# Patient Record
(demographics unavailable — no encounter records)

---

## 2024-11-25 NOTE — PHYSICAL EXAM
[Chaperone Present] : A chaperone was present in the examining room during all aspects of the physical examination [89048] : A chaperone was present during the pelvic exam. [Soft] : soft [Non-tender] : non-tender [Non-distended] : non-distended [No Mass] : no mass [Breast Abnormal Secretion Bloody Fluid] : a bloody discharge [FreeTextEntry4] : episiotomy repair very tender and swollen repair appears intact

## 2024-11-25 NOTE — HISTORY OF PRESENT ILLNESS
[FreeTextEntry1] : pt delivered 4 days ago  BP was borderline in hospital PP here for BP check also nipples very sore and episiotomy repair very painful

## 2024-11-25 NOTE — PLAN
[FreeTextEntry1] : advised she air dry her nipples to try breast shells to help pull out the nipple more between feedings so baby latches with more nipple in her mouth advised sitz baths and at least daily shower with plenty of water to clean the perineum avoid creams that trap in bacteria BP is acceptable ret for PP visit

## 2025-01-08 NOTE — HISTORY OF PRESENT ILLNESS
[Postpartum Follow Up] : postpartum follow up [Complications:___] : no complications [] : delivered by vaginal delivery [Wt. ___] : weighing [unfilled] [Resumed Menses] : has not resumed her menses [Resumed [place]] : has not resumed intercourse [Intended Contraception] : Intended Contraception: [Back to Normal] : is back to normal in size [Normal] : the vagina was normal [Cervix Sample Taken] : cervical sample taken for a Pap smear [Examination Of The Breasts] : breasts are normal [Doing Well] : is doing well [No Sign of Infection] : is showing no signs of infection [None] : None [de-identified] : phq-2 neg